# Patient Record
Sex: MALE
[De-identification: names, ages, dates, MRNs, and addresses within clinical notes are randomized per-mention and may not be internally consistent; named-entity substitution may affect disease eponyms.]

---

## 2023-05-19 ENCOUNTER — NURSE TRIAGE (OUTPATIENT)
Dept: OTHER | Facility: CLINIC | Age: 33
End: 2023-05-19

## 2023-05-20 NOTE — TELEPHONE ENCOUNTER
Location of patient: [place]    Subjective: Caller states \"Burning with urination\"     Current Symptoms:   Burning with urination   Denies back pain  Denies discharge   Denies increased frequency   Denies retention   Denies blood in the urine   Denies swelling    States was in [facility] earlier today- no worse- no triage   Pt asked about what else they can do to relieve pain- given clinical advice about hydrating to wash out bacteria and relieve pain when passing urine   Referred to provider's recommendation     Reason for Disposition   Caller has already spoken with another triager or PCP AND has further questions AND triager able to answer questions.     Protocols used: No Contact or Duplicate Contact Call[facility]